# Patient Record
Sex: MALE | Race: WHITE | NOT HISPANIC OR LATINO | Employment: OTHER | URBAN - METROPOLITAN AREA
[De-identification: names, ages, dates, MRNs, and addresses within clinical notes are randomized per-mention and may not be internally consistent; named-entity substitution may affect disease eponyms.]

---

## 2018-12-22 ENCOUNTER — HOSPITAL ENCOUNTER (EMERGENCY)
Facility: HOSPITAL | Age: 63
Discharge: HOME | End: 2018-12-22
Attending: EMERGENCY MEDICINE | Admitting: EMERGENCY MEDICINE

## 2018-12-22 ENCOUNTER — APPOINTMENT (EMERGENCY)
Dept: RADIOLOGY | Facility: HOSPITAL | Age: 63
End: 2018-12-22

## 2018-12-22 VITALS
SYSTOLIC BLOOD PRESSURE: 138 MMHG | DIASTOLIC BLOOD PRESSURE: 88 MMHG | OXYGEN SATURATION: 96 % | HEART RATE: 93 BPM | BODY MASS INDEX: 26.05 KG/M2 | TEMPERATURE: 99.3 F | RESPIRATION RATE: 16 BRPM | WEIGHT: 182 LBS | HEIGHT: 70 IN

## 2018-12-22 DIAGNOSIS — S16.1XXA STRAIN OF NECK MUSCLE, INITIAL ENCOUNTER: Primary | ICD-10-CM

## 2018-12-22 PROCEDURE — 99283 EMERGENCY DEPT VISIT LOW MDM: CPT

## 2018-12-22 PROCEDURE — 72050 X-RAY EXAM NECK SPINE 4/5VWS: CPT

## 2018-12-22 RX ORDER — IBUPROFEN 600 MG/1
600 TABLET ORAL EVERY 6 HOURS PRN
Qty: 30 TABLET | Refills: 0 | Status: SHIPPED | OUTPATIENT
Start: 2018-12-22 | End: 2019-10-30

## 2018-12-22 RX ORDER — METHOCARBAMOL 500 MG/1
500 TABLET, FILM COATED ORAL 2 TIMES DAILY
Qty: 20 TABLET | Refills: 0 | Status: SHIPPED | OUTPATIENT
Start: 2018-12-22 | End: 2019-10-30

## 2018-12-22 RX ORDER — IBUPROFEN 600 MG/1
600 TABLET ORAL ONCE
Status: COMPLETED | OUTPATIENT
Start: 2018-12-22 | End: 2018-12-22

## 2018-12-22 RX ADMIN — IBUPROFEN 600 MG: 600 TABLET ORAL at 11:03

## 2018-12-22 NOTE — ED PROVIDER NOTES
History  Chief Complaint   Patient presents with    Neck Pain     I have pain to my right side neck  I cannot recall and trauma  Patient can turn head somewhat to right, but not to left  History provided by:  Patient   used: No    Medical Problem   Location:  Pt with posterior neck pain   Severity:  Moderate  Onset quality:  Gradual  Duration:  2 days  Timing:  Constant  Chronicity:  New  Context:  Pt denies trauma  Associated symptoms: no abdominal pain, no chest pain, no congestion, no cough, no diarrhea, no ear pain, no fatigue, no fever, no headaches, no loss of consciousness, no myalgias, no nausea, no rash, no rhinorrhea, no shortness of breath, no sore throat, no vomiting and no wheezing        Prior to Admission Medications   Prescriptions Last Dose Informant Patient Reported? Taking?   multivitamin-iron-minerals-folic acid (CENTRUM) chewable tablet   Yes No   Sig: Chew 1 tablet daily      Facility-Administered Medications: None       History reviewed  No pertinent past medical history  History reviewed  No pertinent surgical history  History reviewed  No pertinent family history  I have reviewed and agree with the history as documented  Social History   Substance Use Topics    Smoking status: Current Some Day Smoker     Types: Cigars    Smokeless tobacco: Never Used    Alcohol use No        Review of Systems   Constitutional: Negative  Negative for fatigue and fever  HENT: Negative  Negative for congestion, ear pain, rhinorrhea and sore throat  Eyes: Negative  Respiratory: Negative  Negative for cough, shortness of breath and wheezing  Cardiovascular: Negative  Negative for chest pain  Gastrointestinal: Negative  Negative for abdominal pain, diarrhea, nausea and vomiting  Endocrine: Negative  Genitourinary: Negative  Musculoskeletal: Negative  Negative for myalgias  Skin: Negative  Negative for rash  Allergic/Immunologic: Negative  Neurological: Negative  Negative for loss of consciousness and headaches  Hematological: Negative  Psychiatric/Behavioral: Negative  All other systems reviewed and are negative  Physical Exam  Physical Exam   Constitutional: He is oriented to person, place, and time  He appears well-developed and well-nourished  HENT:   Head: Normocephalic and atraumatic  Right Ear: External ear normal    Left Ear: External ear normal    Nose: Nose normal    Mouth/Throat: Oropharynx is clear and moist    Eyes: Pupils are equal, round, and reactive to light  Conjunctivae and EOM are normal    Neck: Normal range of motion  Neck supple  bilat trapezius tenderness  No erythema no swelling    Cardiovascular: Normal rate, regular rhythm and normal heart sounds  Pulmonary/Chest: Effort normal and breath sounds normal    Abdominal: Soft  Bowel sounds are normal    Musculoskeletal: Normal range of motion  Neurological: He is alert and oriented to person, place, and time  Skin: Skin is warm  Psychiatric: He has a normal mood and affect  His behavior is normal    Nursing note and vitals reviewed  Vital Signs  ED Triage Vitals [12/22/18 1002]   Temperature Pulse Respirations Blood Pressure SpO2   99 3 °F (37 4 °C) 93 16 138/88 96 %      Temp Source Heart Rate Source Patient Position - Orthostatic VS BP Location FiO2 (%)   Tymp Core -- Sitting Left arm --      Pain Score       7           Vitals:    12/22/18 1002   BP: 138/88   Pulse: 93   Patient Position - Orthostatic VS: Sitting       Visual Acuity      ED Medications  Medications   ibuprofen (MOTRIN) tablet 600 mg (600 mg Oral Given 12/22/18 1103)       Diagnostic Studies  Results Reviewed     None                 XR spine cervical complete 4 or 5 vw non injury   Final Result by Lloyd Kowalski DO (12/22 1414)      No acute osseous abnormality  Degenerative changes as above  Limited evaluation of the left-sided neural foramina   If patient has left-sided radicular symptoms, repeat oblique view could be obtained at no additional cost to the patient  Thin lucent halo surrounding the root of the right most posterior mandibular molar  Correlate clinically for loosening and/or infection  Workstation performed: [id number]                    Procedures  Procedures       Phone Contacts  ED Phone Contact    ED Course                               MDM  CritCare Time    Disposition  Final diagnoses:   Strain of neck muscle, initial encounter     Time reflects when diagnosis was documented in both MDM as applicable and the Disposition within this note     Time User Action Codes Description Comment    12/22/2018 10:56 AM Guy Patel  Add [S16  1XXA] Strain of neck muscle, initial encounter       ED Disposition     ED Disposition Condition Comment    Discharge  Deatra Polo discharge to home/self care      Condition at discharge: Good        Follow-up Information     Follow up With Specialties Details Why Contact Info Additional [address] [facility] Schedule an appointment as soon as possible for a visit  [address], [address] [facility] [address]  [address], [address], [address] [place], [place], [address]          Discharge Medication List as of 12/22/2018 10:57 AM      START taking these medications    Details   ibuprofen (MOTRIN) 600 mg tablet Take 1 tablet (600 mg total) by mouth every 6 (six) hours as needed (pain), Starting Sat 12/22/2018, Print      methocarbamol (ROBAXIN) 500 mg tablet Take 1 tablet (500 mg total) by mouth 2 (two) times a day, Starting Sat 12/22/2018, Print         CONTINUE these medications which have NOT CHANGED    Details   multivitamin-iron-minerals-folic acid (CENTRUM) chewable tablet Chew 1 tablet daily, Until Discontinued, Historical Med           No discharge procedures on file     ED Provider  Electronically Signed by           Kari Al PA-C  12/22/18 9979

## 2018-12-22 NOTE — DISCHARGE INSTRUCTIONS
Cervical Strain   WHAT YOU NEED TO KNOW:   A cervical strain is a stretched or torn muscle or tendon in your neck  Tendons are strong tissues that connect muscles to bones  Common causes of cervical strains include a car accident, a fall, or a sports injury  DISCHARGE INSTRUCTIONS:   Seek care immediately if:   · You have pain or numbness from your shoulder down to your hand  · You have problems with your vision, hearing, or balance  · You feel confused or cannot concentrate  · You have problems with movement and strength  Contact your healthcare provider if:   · You have increased swelling or pain in your neck  · You have questions or concerns about your condition or care  Medicines: You may need any of the following:  · Acetaminophen  decreases pain and fever  It is available without a doctor's order  Ask how much to take and how often to take it  Follow directions  Read the labels of all other medicines you are using to see if they also contain acetaminophen, or ask your doctor or pharmacist  Acetaminophen can cause liver damage if not taken correctly  Do not use more than 4 grams (4,000 milligrams) total of acetaminophen in one day  · NSAIDs , such as ibuprofen, help decrease swelling, pain, and fever  This medicine is available with or without a doctor's order  NSAIDs can cause stomach bleeding or kidney problems in certain people  If you take blood thinner medicine, always ask your healthcare provider if NSAIDs are safe for you  Always read the medicine label and follow directions  · Muscle relaxers  help decrease pain and muscle spasms  · Prescription pain medicine  may be given  Ask your healthcare provider how to take this medicine safely  Some prescription pain medicines contain acetaminophen  Do not take other medicines that contain acetaminophen without talking to your healthcare provider  Too much acetaminophen may cause liver damage   Prescription pain medicine may cause constipation  Ask your healthcare provider how to prevent or treat constipation  · Take your medicine as directed  Contact your healthcare provider if you think your medicine is not helping or if you have side effects  Tell him or her if you are allergic to any medicine  Keep a list of the medicines, vitamins, and herbs you take  Include the amounts, and when and why you take them  Bring the list or the pill bottles to follow-up visits  Carry your medicine list with you in case of an emergency  Manage your symptoms:   · Apply heat  on your neck for 15 to 20 minutes, 4 to 6 times a day or as directed  Heat helps decrease pain, stiffness, and muscle spasms  · Begin gentle neck exercises  as soon as you can move your neck without pain  Exercises will help decrease stiffness and improve the strength and movement of your neck  Ask your healthcare provider what kind of exercises you should do  · Gradually return to your usual activities as directed  Stop if you have pain  Avoid activities that can cause more damage to your neck, such as heavy lifting or strenuous exercise  · Sleep without a pillow  to help decrease pain  Instead, roll a small towel tightly and place it under your neck  · Go to physical therapy as directed  A physical therapist teaches you exercises to help improve movement and strength, and to decrease pain  Prevent neck injury:   · Drive safely  Make sure everyone in your car wears a seatbelt  A seatbelt can save your life if you are in an accident  Do not use your cell phone when you are driving  This could distract you and cause an accident  Pull over if you need to make a call or send a text message  · Wear helmets, lifejackets, and protective gear  Always wear a helmet when you ride a bike or motorcycle, go skiing, or play sports that could cause a head injury  Wear protective equipment when you play sports   Wear a lifejacket when you are on a boat or doing water sports  Follow up with your healthcare provider as directed: You may be referred to an orthopedist or physical therapies  Write down your questions so you remember to ask them during your visits  © 2017 [address] Information is for End User's use only and may not be sold, redistributed or otherwise used for commercial purposes  All illustrations and images included in CareNotes® are the copyrighted property of A D A M [employer] Inc  or [employer]u  The above information is an  only  It is not intended as medical advice for individual conditions or treatments  Talk to your doctor, nurse or pharmacist before following any medical regimen to see if it is safe and effective for you

## 2019-03-17 ENCOUNTER — HOSPITAL ENCOUNTER (EMERGENCY)
Facility: HOSPITAL | Age: 64
Discharge: HOME | End: 2019-03-17
Attending: EMERGENCY MEDICINE | Admitting: EMERGENCY MEDICINE

## 2019-03-17 ENCOUNTER — APPOINTMENT (EMERGENCY)
Dept: RADIOLOGY | Facility: HOSPITAL | Age: 64
End: 2019-03-17

## 2019-03-17 VITALS
SYSTOLIC BLOOD PRESSURE: 177 MMHG | HEIGHT: 68 IN | DIASTOLIC BLOOD PRESSURE: 90 MMHG | BODY MASS INDEX: 28.19 KG/M2 | HEART RATE: 89 BPM | TEMPERATURE: 98 F | RESPIRATION RATE: 16 BRPM | WEIGHT: 186 LBS | OXYGEN SATURATION: 98 %

## 2019-03-17 DIAGNOSIS — S93.609A FOOT SPRAIN: Primary | ICD-10-CM

## 2019-03-17 DIAGNOSIS — S83.90XA: ICD-10-CM

## 2019-03-17 PROCEDURE — 73630 X-RAY EXAM OF FOOT: CPT

## 2019-03-17 PROCEDURE — 73590 X-RAY EXAM OF LOWER LEG: CPT

## 2019-03-17 PROCEDURE — 99283 EMERGENCY DEPT VISIT LOW MDM: CPT

## 2019-03-17 RX ORDER — IBUPROFEN 600 MG/1
600 TABLET ORAL EVERY 6 HOURS PRN
Qty: 30 TABLET | Refills: 0 | Status: SHIPPED | OUTPATIENT
Start: 2019-03-17 | End: 2019-10-30

## 2019-03-17 NOTE — ED PROVIDER NOTES
History  Chief Complaint   Patient presents with    Foot Swelling     I have some swelling and pain to my left foot and lower leg  I have had it for two weeks  Pain mostly at night  History provided by:  Patient   used: No    Medical Problem   Location:  Pt with left foot and lower leg pain  no trauma for 1 week  Severity:  Mild  Onset quality:  Gradual  Duration:  1 week  Timing:  Constant  Chronicity:  New  Associated symptoms: no abdominal pain, no chest pain, no congestion, no cough, no diarrhea, no ear pain, no fatigue, no fever, no headaches, no loss of consciousness, no myalgias, no nausea, no rash, no rhinorrhea, no shortness of breath, no sore throat, no vomiting and no wheezing        Prior to Admission Medications   Prescriptions Last Dose Informant Patient Reported? Taking?   ibuprofen (MOTRIN) 600 mg tablet   No No   Sig: Take 1 tablet (600 mg total) by mouth every 6 (six) hours as needed (pain)   methocarbamol (ROBAXIN) 500 mg tablet   No No   Sig: Take 1 tablet (500 mg total) by mouth 2 (two) times a day   multivitamin-iron-minerals-folic acid (CENTRUM) chewable tablet   Yes No   Sig: Chew 1 tablet daily      Facility-Administered Medications: None       History reviewed  No pertinent past medical history  History reviewed  No pertinent surgical history  History reviewed  No pertinent family history  I have reviewed and agree with the history as documented  Social History     Tobacco Use    Smoking status: Current Some Day Smoker     Types: Cigars    Smokeless tobacco: Never Used   Substance Use Topics    Alcohol use: No    Drug use: No        Review of Systems   Constitutional: Negative  Negative for fatigue and fever  HENT: Negative  Negative for congestion, ear pain, rhinorrhea and sore throat  Eyes: Negative  Respiratory: Negative  Negative for cough, shortness of breath and wheezing  Cardiovascular: Negative  Negative for chest pain  Gastrointestinal: Negative  Negative for abdominal pain, diarrhea, nausea and vomiting  Endocrine: Negative  Genitourinary: Negative  Musculoskeletal: Negative  Negative for myalgias  Skin: Negative  Negative for rash  Allergic/Immunologic: Negative  Neurological: Negative  Negative for loss of consciousness and headaches  Hematological: Negative  Psychiatric/Behavioral: Negative  All other systems reviewed and are negative  Physical Exam  Physical Exam   Constitutional: He is oriented to person, place, and time  He appears well-developed and well-nourished  Pt declines splint     HENT:   Head: Normocephalic  Right Ear: External ear normal    Left Ear: External ear normal    Mouth/Throat: Oropharynx is clear and moist    Eyes: Pupils are equal, round, and reactive to light  Conjunctivae and EOM are normal    Neck: Normal range of motion  Neck supple  Cardiovascular: Normal rate, regular rhythm and normal heart sounds  Pulmonary/Chest: Effort normal and breath sounds normal    Abdominal: Soft  Bowel sounds are normal    Musculoskeletal:   Left foot dorsum tenderness  No swelling  Left midshaft tibia and fibula pain    Neurological: He is alert and oriented to person, place, and time  Skin: Skin is warm  Psychiatric: He has a normal mood and affect  His behavior is normal    Nursing note and vitals reviewed        Vital Signs  ED Triage Vitals [03/17/19 0911]   Temperature Pulse Respirations Blood Pressure SpO2   98 °F (36 7 °C) 89 16 (!) 177/90 98 %      Temp Source Heart Rate Source Patient Position - Orthostatic VS BP Location FiO2 (%)   Tympanic Monitor Sitting Left arm --      Pain Score       6           Vitals:    03/17/19 0911   BP: (!) 177/90   Pulse: 89   Patient Position - Orthostatic VS: Sitting       qSOFA     Row Name 03/17/19 0911                Altered mental status GCS < 15  --        Respiratory Rate > / =15  0        Systolic BP < / =948  0        Q Sofa Score  0              Visual Acuity      ED Medications  Medications - No data to display    Diagnostic Studies  Results Reviewed     None                 XR foot 3+ views LEFT   Final Result by Sam Francois MD (03/17 1104)      Small calcaneal plantar spur  No acute osseous abnormality            Workstation performed: [id number]         XR tibia fibula 2 views LEFT   Final Result by Kelton Monterroso MD (03/17 1104)      No acute osseous abnormality  Workstation performed: [id number]                    Procedures  Procedures       Phone Contacts  ED Phone Contact    ED Course                               MDM    Disposition  Final diagnoses: Foot sprain   Sprain of lower leg     Time reflects when diagnosis was documented in both MDM as applicable and the Disposition within this note     Time User Action Codes Description Comment    3/17/2019  9:49 AM Regina Schwab  Add [S93 609A] Foot sprain     3/17/2019  9:49 AM Keya Emmanuel  Add [S83 90XA] Sprain of lower leg       ED Disposition     ED Disposition Condition Date/Time Comment    Discharge Stable Sun Mar 17, 2019  9:51 AM Esequiel Fonseca discharge to home/self care  Follow-up Information     Follow up With Specialties Details Why [address] [facility]   [address]  [place] [address] [facility]  [phone]            Discharge Medication List as of 3/17/2019  9:51 AM      START taking these medications    Details   !! ibuprofen (MOTRIN) 600 mg tablet Take 1 tablet (600 mg total) by mouth every 6 (six) hours as needed (pain), Starting Sun 3/17/2019, Print       !! - Potential duplicate medications found  Please discuss with provider        CONTINUE these medications which have NOT CHANGED    Details   !! ibuprofen (MOTRIN) 600 mg tablet Take 1 tablet (600 mg total) by mouth every 6 (six) hours as needed (pain), Starting Sat 12/22/2018, Print      methocarbamol (ROBAXIN) 500 mg tablet Take 1 tablet (500 mg total) by mouth 2 (two) times a day, Starting Sat 12/22/2018, Print      multivitamin-iron-minerals-folic acid (CENTRUM) chewable tablet Chew 1 tablet daily, Until Discontinued, Historical Med       !! - Potential duplicate medications found  Please discuss with provider  No discharge procedures on file      ED Provider  Electronically Signed by           Marian Pickard PA-C  03/17/19 5435

## 2019-10-30 ENCOUNTER — HOSPITAL ENCOUNTER (EMERGENCY)
Facility: HOSPITAL | Age: 64
Discharge: HOME | End: 2019-10-30
Attending: EMERGENCY MEDICINE | Admitting: EMERGENCY MEDICINE

## 2019-10-30 VITALS
OXYGEN SATURATION: 97 % | HEART RATE: 76 BPM | BODY MASS INDEX: 27.69 KG/M2 | SYSTOLIC BLOOD PRESSURE: 153 MMHG | RESPIRATION RATE: 16 BRPM | DIASTOLIC BLOOD PRESSURE: 71 MMHG | TEMPERATURE: 98 F | WEIGHT: 182.1 LBS

## 2019-10-30 DIAGNOSIS — H57.89 IRRITATION OF RIGHT EYE: Primary | ICD-10-CM

## 2019-10-30 PROCEDURE — 99283 EMERGENCY DEPT VISIT LOW MDM: CPT

## 2019-10-30 PROCEDURE — 99283 EMERGENCY DEPT VISIT LOW MDM: CPT | Performed by: PHYSICIAN ASSISTANT

## 2019-10-30 RX ORDER — TETRACAINE HYDROCHLORIDE 5 MG/ML
1 SOLUTION OPHTHALMIC ONCE
Status: COMPLETED | OUTPATIENT
Start: 2019-10-30 | End: 2019-10-30

## 2019-10-30 RX ADMIN — TETRACAINE HYDROCHLORIDE 1 DROP: 5 SOLUTION OPHTHALMIC at 07:35

## 2019-10-30 RX ADMIN — FLUORESCEIN SODIUM 1 STRIP: 1 STRIP OPHTHALMIC at 07:34

## 2019-10-30 NOTE — ED PROVIDER NOTES
History  Chief Complaint   Patient presents with    Foreign Body in [address] in right eye  Attempted to wash eye out and using visine without relief  Redness noted  Patient is a 36-year-old male who states that he was cutting plywood yesterday without safety glasses on and bleeding got something into his eye  He complains of pain to the right eye  He denies any visual changes  No known allergies          Prior to Admission Medications   Prescriptions Last Dose Informant Patient Reported? Taking?   multivitamin-iron-minerals-folic acid (CENTRUM) chewable tablet   Yes Yes   Sig: Chew 1 tablet daily      Facility-Administered Medications: None       History reviewed  No pertinent past medical history  History reviewed  No pertinent surgical history  History reviewed  No pertinent family history  I have reviewed and agree with the history as documented  Social History     Tobacco Use    Smoking status: Current Some Day Smoker     Types: Cigars    Smokeless tobacco: Never Used   Substance Use Topics    Alcohol use: No    Drug use: No        Review of Systems   Constitutional: Negative for chills, fatigue and fever  HENT: Negative for congestion, ear pain, rhinorrhea, sinus pressure, sneezing and sore throat  Eyes: Positive for pain, redness and itching  Respiratory: Negative for cough, choking, chest tightness, shortness of breath and wheezing  Cardiovascular: Negative for chest pain and palpitations  Gastrointestinal: Negative for abdominal pain, constipation, diarrhea, nausea and vomiting  All other systems reviewed and are negative  Physical Exam  Physical Exam   Constitutional: He is oriented to person, place, and time  Vital signs are normal  He appears well-developed and well-nourished  He is cooperative  No distress  HENT:   Head: Normocephalic and atraumatic     Right Ear: External ear normal    Left Ear: External ear normal    Nose: Nose normal    Mouth/Throat: Oropharynx is clear and moist    Eyes: Pupils are equal, round, and reactive to light  EOM and lids are normal  Lids are everted and swept, no foreign bodies found  Right conjunctiva is injected  Right eye redness  Patient complains of foreign body sensation  Fluorescein staining did not reveal any corneal abrasions  No foreign bodies identified  Neck: Normal range of motion  Neck supple  Cardiovascular: Normal rate, regular rhythm, normal heart sounds and intact distal pulses  Exam reveals no gallop and no friction rub  No murmur heard  Pulmonary/Chest: Effort normal and breath sounds normal  No stridor  No respiratory distress  He has no wheezes  He has no rales  Abdominal: Soft  Bowel sounds are normal  He exhibits no distension  There is no tenderness  There is no guarding  Musculoskeletal: Normal range of motion  Neurological: He is alert and oriented to person, place, and time  Skin: Skin is warm  Capillary refill takes less than 2 seconds  Nursing note and vitals reviewed  Vital Signs  ED Triage Vitals [10/30/19 0714]   Temperature Pulse Respirations Blood Pressure SpO2   98 °F (36 7 °C) 76 16 153/71 97 %      Temp Source Heart Rate Source Patient Position - Orthostatic VS BP Location FiO2 (%)   Oral Monitor Sitting Right arm --      Pain Score       8           Vitals:    10/30/19 0714   BP: 153/71   Pulse: 76   Patient Position - Orthostatic VS: Sitting         Visual Acuity  Visual Acuity      Most Recent Value   Visual acuity R eye is  20/100   Visual acuity Left eye is  20/50   Visual acuity in both eyes is  20/200   Wearing corrective eyewear/lenses?   No          ED Medications  Medications   tetracaine 0 5 % ophthalmic solution 1 drop (1 drop Both Eyes Given 10/30/19 0735)   fluorescein sodium sterile ophthalmic strip 1 strip (1 strip Left Eye Given 10/30/19 0734)       Diagnostic Studies  Results Reviewed     None                 No orders to display Procedures  Procedures       ED Course                               MDM  Number of Diagnoses or Management Options  Diagnosis management comments: Recommend follow-up with Ophthalmology  No foreign bodies or abrasions identified by me at this time  Fluorescein staining was unremarkable  Patient understanding and in agreement with need to see Ophthalmology  Tetracaine drops were provided symptomatic relief  Risk of Complications, Morbidity, and/or Mortality  Presenting problems: moderate  Diagnostic procedures: low  Management options: low    Patient Progress  Patient progress: stable      Disposition  Final diagnoses:   Irritation of right eye     Time reflects when diagnosis was documented in both MDM as applicable and the Disposition within this note     Time User Action Codes Description Comment    10/30/2019  7:45 AM Maurizio Purvis Add [H57 89] Irritation of right eye       ED Disposition     ED Disposition Condition Date/Time Comment    Discharge Stable Wed Oct 30, 2019  7:45 AM Sonya Munoz discharge to home/self care  Follow-up Information     Follow up With Specialties Details Why [facility] Schedule an appointment as soon as possible for a visit   [facility]  [phone]            Patient's Medications   Discharge Prescriptions    No medications on file     No discharge procedures on file      ED Provider  Electronically Signed by           Kaley Almonte PA-C  10/30/19 0399

## 2020-12-29 ENCOUNTER — HOSPITAL ENCOUNTER (EMERGENCY)
Facility: HOSPITAL | Age: 65
Discharge: HOME | End: 2020-12-29
Attending: EMERGENCY MEDICINE | Admitting: EMERGENCY MEDICINE

## 2020-12-29 ENCOUNTER — APPOINTMENT (EMERGENCY)
Dept: RADIOLOGY | Facility: HOSPITAL | Age: 65
End: 2020-12-29

## 2020-12-29 VITALS
OXYGEN SATURATION: 100 % | RESPIRATION RATE: 16 BRPM | BODY MASS INDEX: 28.59 KG/M2 | HEART RATE: 95 BPM | TEMPERATURE: 98.4 F | SYSTOLIC BLOOD PRESSURE: 148 MMHG | DIASTOLIC BLOOD PRESSURE: 76 MMHG | WEIGHT: 188.05 LBS

## 2020-12-29 DIAGNOSIS — M54.16 LUMBAR RADICULOPATHY: Primary | ICD-10-CM

## 2020-12-29 PROCEDURE — 99284 EMERGENCY DEPT VISIT MOD MDM: CPT

## 2020-12-29 PROCEDURE — 72100 X-RAY EXAM L-S SPINE 2/3 VWS: CPT

## 2020-12-29 PROCEDURE — 96372 THER/PROPH/DIAG INJ SC/IM: CPT

## 2020-12-29 PROCEDURE — 99284 EMERGENCY DEPT VISIT MOD MDM: CPT | Performed by: PHYSICIAN ASSISTANT

## 2020-12-29 PROCEDURE — 73502 X-RAY EXAM HIP UNI 2-3 VIEWS: CPT

## 2020-12-29 RX ORDER — CYCLOBENZAPRINE HCL 10 MG
10 TABLET ORAL ONCE
Status: COMPLETED | OUTPATIENT
Start: 2020-12-29 | End: 2020-12-29

## 2020-12-29 RX ORDER — KETOROLAC TROMETHAMINE 30 MG/ML
15 INJECTION, SOLUTION INTRAMUSCULAR; INTRAVENOUS ONCE
Status: COMPLETED | OUTPATIENT
Start: 2020-12-29 | End: 2020-12-29

## 2020-12-29 RX ORDER — CYCLOBENZAPRINE HCL 10 MG
10 TABLET ORAL 2 TIMES DAILY PRN
Qty: 20 TABLET | Refills: 0 | Status: SHIPPED | OUTPATIENT
Start: 2020-12-29 | End: 2022-01-25 | Stop reason: SDUPTHER

## 2020-12-29 RX ORDER — KETOROLAC TROMETHAMINE 10 MG/1
10 TABLET, FILM COATED ORAL EVERY 6 HOURS PRN
Qty: 20 TABLET | Refills: 0 | Status: SHIPPED | OUTPATIENT
Start: 2020-12-29 | End: 2022-01-25

## 2020-12-29 RX ADMIN — KETOROLAC TROMETHAMINE 15 MG: 30 INJECTION, SOLUTION INTRAMUSCULAR; INTRAVENOUS at 01:04

## 2020-12-29 RX ADMIN — CYCLOBENZAPRINE HYDROCHLORIDE 10 MG: 10 TABLET, FILM COATED ORAL at 01:04

## 2020-12-29 NOTE — ED PROVIDER NOTES
History  Chief Complaint   Patient presents with    Hip Pain     Patient fell Micha hernandez, did not feel any pain or injury and tonight he started to feel left hip and upper leg pain, left side, did not take any medicine for pain  Patient presents for an evaluation of left sided lower back, left buttock, and left lateral thigh pain ongoing for the last 1-2 hours  He reports that he did fall 5 days ago, but he fell on his right leg and had no pain at the time  States that today he had been mostly watching TV when pain suddenly started  Denies history of lumbar radiculopathy  Denies any IV drug use, fever, chills, difficulty ambulating, bowel/ bladder incontinence, history of chronic steroid use, saddle anesthesia  Has not tried anything for symptoms prior to arrival  No other complaints  Prior to Admission Medications   Prescriptions Last Dose Informant Patient Reported? Taking?   multivitamin-iron-minerals-folic acid (CENTRUM) chewable tablet   Yes No   Sig: Chew 1 tablet daily      Facility-Administered Medications: None       Past Medical History:   Diagnosis Date    Pericarditis        History reviewed  No pertinent surgical history  History reviewed  No pertinent family history  I have reviewed and agree with the history as documented  E-Cigarette/Vaping    E-Cigarette Use Never User      E-Cigarette/Vaping Substances     Social History     Tobacco Use    Smoking status: Current Some Day Smoker     Types: Cigars    Smokeless tobacco: Never Used   Substance Use Topics    Alcohol use: Yes     Comment: socially    Drug use: No       Review of Systems   Constitutional: Negative for chills and fever  HENT: Negative for congestion, ear pain and sore throat  Eyes: Negative for pain  Respiratory: Negative for cough and shortness of breath  Cardiovascular: Negative for chest pain  Gastrointestinal: Negative for abdominal pain, nausea and vomiting     Genitourinary: Negative for dysuria  Musculoskeletal: Positive for arthralgias, back pain and myalgias  Skin: Negative for rash  Neurological: Negative for dizziness, weakness and numbness  Psychiatric/Behavioral: Negative for suicidal ideas  All other systems reviewed and are negative  Physical Exam  Physical Exam  Vitals signs reviewed  Constitutional:       General: He is not in acute distress  Appearance: He is well-developed  He is not diaphoretic  HENT:      Head: Normocephalic and atraumatic  Right Ear: External ear normal       Left Ear: External ear normal       Nose: Nose normal    Eyes:      Pupils: Pupils are equal, round, and reactive to light  Neck:      Musculoskeletal: Normal range of motion and neck supple  Cardiovascular:      Rate and Rhythm: Normal rate and regular rhythm  Heart sounds: Normal heart sounds  Pulmonary:      Effort: Pulmonary effort is normal       Breath sounds: Normal breath sounds  Abdominal:      General: Bowel sounds are normal       Palpations: Abdomen is soft  Tenderness: There is no abdominal tenderness  Musculoskeletal:      Left hip: He exhibits decreased range of motion and tenderness  He exhibits normal strength, no bony tenderness, no swelling, no crepitus, no deformity and no laceration  Back:         Legs:       Comments: Pain mostly at lateral aspect of left thigh  No midline lumbar tenderness  Neurovascularly intact distally with full sensation  2+ pedal pulses b/l   Skin:     General: Skin is warm and dry  Neurological:      Mental Status: He is alert and oriented to person, place, and time           Vital Signs  ED Triage Vitals [12/29/20 0102]   Temperature Pulse Respirations Blood Pressure SpO2   98 4 °F (36 9 °C) 95 16 148/76 100 %      Temp Source Heart Rate Source Patient Position - Orthostatic VS BP Location FiO2 (%)   Tympanic Monitor Lying Left arm --      Pain Score       Worst Possible Pain           Vitals:    12/29/20 0102 BP: 148/76   Pulse: 95   Patient Position - Orthostatic VS: Lying         Visual Acuity      ED Medications  Medications   cyclobenzaprine (FLEXERIL) tablet 10 mg (10 mg Oral Given 12/29/20 0104)   ketorolac (TORADOL) injection 15 mg (15 mg Intramuscular Given 12/29/20 0104)       Diagnostic Studies  Results Reviewed     None                 XR spine lumbar 2 or 3 views injury    (Results Pending)   XR hip/pelv 2-3 vws left if performed    (Results Pending)              Procedures  Procedures         ED Course                             SBIRT 20yo+      Most Recent Value   SBIRT (22 yo +)   In order to provide better care to our patients, we are screening all of our patients for alcohol and drug use  Would it be okay to ask you these screening questions? Yes Filed at: 12/29/2020 0122   Initial Alcohol Screen: US AUDIT-C    1  How often do you have a drink containing alcohol? 2 Filed at: 12/29/2020 0122   2  How many drinks containing alcohol do you have on a typical day you are drinking? 1 Filed at: 12/29/2020 0122   3a  Male UNDER 65: How often do you have five or more drinks on one occasion? 2 Filed at: 12/29/2020 0122   Audit-C Score  5 Filed at: 12/29/2020 0122   VANESSA: How many times in the past year have you    Used an illegal drug or used a prescription medication for non-medical reasons? Never Filed at: 12/29/2020 0122                    MDM  Number of Diagnoses or Management Options  Lumbar radiculopathy:   Diagnosis management comments: No fx/ dislocation on xrs  Severe degenerative disc disease noted  Will dc with toradol/ flexeril  Amb referral placed [facility]  Given referral as well  Agreeable with discharge  Patient verbalizes understanding and agrees with plan  The management plan was discussed in detail with the patient at bedside and all questions were answered  Prior to discharge, I provided both verbal and written instructions   I discussed with the patient the signs and symptoms for which to return to the emergency department  All questions were answered and patient was comfortable with the plan of care and discharged to home  The patient agrees to return to the Emergency Department for concerns and/or progression of illness  Disposition  Final diagnoses:   Lumbar radiculopathy     Time reflects when diagnosis was documented in both MDM as applicable and the Disposition within this note     Time User Action Codes Description Comment    12/29/2020  1:50 AM Ludwin Hung Add [T24 16] Lumbar radiculopathy       ED Disposition     ED Disposition Condition Date/Time Comment    Discharge Stable Tue Dec 29, 2020  1:50 AM Salvador Ohms discharge to home/self care              Follow-up Information     Follow up With Specialties Details Why Contact Info Additional Information    [facility] - [facility] Therapy   [phone]          Patient's Medications   Discharge Prescriptions    CYCLOBENZAPRINE (FLEXERIL) 10 MG TABLET    Take 1 tablet (10 mg total) by mouth 2 (two) times a day as needed for muscle spasms       Start Date: 12/29/2020End Date: --       Order Dose: 10 mg       Quantity: 20 tablet    Refills: 0    KETOROLAC (TORADOL) 10 MG TABLET    Take 1 tablet (10 mg total) by mouth every 6 (six) hours as needed for moderate pain       Start Date: 12/29/2020End Date: --       Order Dose: 10 mg       Quantity: 20 tablet    Refills: 0         PDMP Review     None          ED Provider  Electronically Signed by           Libra Sierra PA-C  12/29/20 0202

## 2020-12-29 NOTE — DISCHARGE INSTRUCTIONS
Please [facility] with the [facility]  I have provided you with a referral  Their office should also give you a call concerning a follow up appointment  Use medication as prescribed  Please return to the ER with any worsening symptoms

## 2021-01-12 ENCOUNTER — TELEPHONE (OUTPATIENT)
Dept: PHYSICAL THERAPY | Facility: OTHER | Age: 66
End: 2021-01-12

## 2021-01-12 NOTE — TELEPHONE ENCOUNTER
Voice mail/message left requesting patient to return call to [facility] program including our hours of business and phone number  Kindly asked to LM with Full Name,  and Reminded CB will come from a non- number as the nurses are working remotely/off-site      Referral deferred for f/u attempt per protocol

## 2021-03-10 DIAGNOSIS — Z23 ENCOUNTER FOR IMMUNIZATION: ICD-10-CM

## 2022-01-11 ENCOUNTER — TELEPHONE (OUTPATIENT)
Dept: FAMILY MEDICINE CLINIC | Facility: CLINIC | Age: 67
End: 2022-01-11

## 2022-01-25 ENCOUNTER — OFFICE VISIT (OUTPATIENT)
Dept: FAMILY MEDICINE CLINIC | Facility: CLINIC | Age: 67
End: 2022-01-25

## 2022-01-25 VITALS
SYSTOLIC BLOOD PRESSURE: 122 MMHG | TEMPERATURE: 97.9 F | WEIGHT: 192 LBS | RESPIRATION RATE: 16 BRPM | HEART RATE: 93 BPM | DIASTOLIC BLOOD PRESSURE: 86 MMHG | BODY MASS INDEX: 27.49 KG/M2 | OXYGEN SATURATION: 94 % | HEIGHT: 70 IN

## 2022-01-25 DIAGNOSIS — M79.605 BILATERAL LEG PAIN: ICD-10-CM

## 2022-01-25 DIAGNOSIS — Z76.89 ENCOUNTER TO ESTABLISH CARE: Primary | ICD-10-CM

## 2022-01-25 DIAGNOSIS — Z12.12 SCREENING FOR COLORECTAL CANCER: ICD-10-CM

## 2022-01-25 DIAGNOSIS — M25.511 ACUTE PAIN OF BOTH SHOULDERS: ICD-10-CM

## 2022-01-25 DIAGNOSIS — Z23 ENCOUNTER FOR IMMUNIZATION: ICD-10-CM

## 2022-01-25 DIAGNOSIS — M25.512 ACUTE PAIN OF BOTH SHOULDERS: ICD-10-CM

## 2022-01-25 DIAGNOSIS — M54.16 LUMBAR RADICULOPATHY: ICD-10-CM

## 2022-01-25 DIAGNOSIS — M79.604 BILATERAL LEG PAIN: ICD-10-CM

## 2022-01-25 DIAGNOSIS — Z13.220 LIPID SCREENING: ICD-10-CM

## 2022-01-25 DIAGNOSIS — Z12.11 SCREENING FOR COLORECTAL CANCER: ICD-10-CM

## 2022-01-25 PROCEDURE — G0009 ADMIN PNEUMOCOCCAL VACCINE: HCPCS | Performed by: NURSE PRACTITIONER

## 2022-01-25 PROCEDURE — 90732 PPSV23 VACC 2 YRS+ SUBQ/IM: CPT | Performed by: NURSE PRACTITIONER

## 2022-01-25 PROCEDURE — 99205 OFFICE O/P NEW HI 60 MIN: CPT | Performed by: NURSE PRACTITIONER

## 2022-01-25 RX ORDER — CYCLOBENZAPRINE HCL 10 MG
10 TABLET ORAL 2 TIMES DAILY PRN
Qty: 60 TABLET | Refills: 3 | Status: SHIPPED | OUTPATIENT
Start: 2022-01-25

## 2022-01-25 NOTE — PROGRESS NOTES
Assessment/Plan:    Problem List Items Addressed This Visit        Nervous and Auditory    Lumbar radiculopathy     Start muscle relaxer, f/u with PT  Relevant Medications    cyclobenzaprine (FLEXERIL) 10 mg tablet       Other    Bilateral leg pain     Check electrolytes - if normal will obtain duplex scan  Advised to stay active  F/u with PT  Relevant Orders    CBC and differential    Comprehensive metabolic panel      Other Visit Diagnoses     Encounter to establish care    -  Primary    Encounter for immunization        Relevant Orders    PNEUMOCOCCAL POLYSACCHARIDE VACCINE 23-VALENT =>3YO SQ IM (Completed)    Screening for colorectal cancer        Relevant Orders    Ambulatory referral to Gastroenterology    Acute pain of both shoulders        Relevant Orders    Ambulatory Referral to Physical Therapy    Lipid screening        Relevant Orders    Lipid panel            No follow-ups on file  A chart review was performed and previous primary care visit notes were reviewed  All applicable imaging studies were reviewed and images were reviewed personally  All applicable laboratory studies were reviewed personally  Care everywhere review was performed if  available and all pertinent notes were reviewed  Subjective:     HPI: Patrick Resendez is a 73 y o  male who  has a past medical history of Acute pericarditis and Pericarditis  who presented to the office today   To establish care  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 he was using a table saw, slipped and cut his entire arm completely off distal to the left elbow  It was reattached by trauma surgeon at [facility] however in November a therapeutic amputation had to be done  Laabhijeet Cassette He also has 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 woodworking accidents as well  Girlfriend states he never followed regularly with a doctor, does believe he absolutely needs glasses but has not gone to an eye doctor for evaluation    His only health concern today is bilateral muscular leg pain that occurs only with activity, as well as intermittent neck and shoulder pain  The following portions of the patient's history were reviewed and updated as appropriate: allergies, current medications, past family history, past medical history, past social history, past surgical history, and problem list     Current Outpatient Medications on File Prior to Visit   Medication Sig Dispense Refill    multivitamin-iron-minerals-folic acid (CENTRUM) chewable tablet Chew 1 tablet daily       No current facility-administered medications on file prior to visit  Review of Systems   Constitutional: Negative  HENT: Negative  Eyes: Positive for visual disturbance  Respiratory: Negative  Cardiovascular: Negative  Gastrointestinal: Negative  Genitourinary: Negative  Musculoskeletal: Positive for arthralgias and neck pain  Left arm amputation 2 months ago, b/l leg pain, shoulder pain  Neurological: Negative  Psychiatric/Behavioral: Negative  BMI Counseling: Body mass index is 27 55 kg/m²  The BMI is above normal  Nutrition recommendations include decreasing portion sizes, encouraging healthy choices of fruits and vegetables, decreasing fast food intake, consuming healthier snacks, limiting drinks that contain sugar, moderation in carbohydrate intake, increasing intake of lean protein, reducing intake of saturated and trans fat and reducing intake of cholesterol  Exercise recommendations include moderate physical activity 150 minutes/week, exercising 3-5 times per week and obtaining a gym membership  No pharmacotherapy was ordered  Rationale for BMI follow-up plan is due to patient being overweight or obese  Depression Screening and Follow-up Plan: Patient was screened for depression during today's encounter  They screened negative with a PHQ-2 score of 0  Tobacco Cessation Counseling: Tobacco cessation counseling was provided   The patient is sincerely urged to quit consumption of tobacco  He is not ready to quit tobacco  Medication options discussed  Patient refused medication  Objective:    /86 (BP Location: Right arm, Patient Position: Sitting, Cuff Size: Standard)   Pulse 93   Temp 97 9 °F (36 6 °C)   Resp 16   Ht 5' 10" (1 778 m)   Wt 87 1 kg (192 lb)   SpO2 94%   BMI 27 55 kg/m²     Physical Exam  Constitutional:       Appearance: Normal appearance  He is normal weight  HENT:      Head: Normocephalic  Right Ear: External ear normal       Left Ear: External ear normal       Nose: Nose normal       Mouth/Throat:      Mouth: Mucous membranes are moist    Eyes:      Extraocular Movements: Extraocular movements intact  Conjunctiva/sclera: Conjunctivae normal    Cardiovascular:      Rate and Rhythm: Normal rate and regular rhythm  Pulses: Normal pulses  Heart sounds: Normal heart sounds  Pulmonary:      Effort: Pulmonary effort is normal       Breath sounds: Normal breath sounds  Musculoskeletal:         General: No tenderness or signs of injury  Normal range of motion  Left forearm: Deformity present  No swelling or edema  Right hand: Deformity present  Cervical back: Normal range of motion  Right lower leg: No edema  Left lower leg: No edema  Comments: 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

## 2022-01-27 PROBLEM — M79.604 BILATERAL LEG PAIN: Status: ACTIVE | Noted: 2022-01-27

## 2022-01-27 PROBLEM — M54.16 LUMBAR RADICULOPATHY: Status: ACTIVE | Noted: 2022-01-27

## 2022-01-27 PROBLEM — M79.605 BILATERAL LEG PAIN: Status: ACTIVE | Noted: 2022-01-27

## 2022-01-28 ENCOUNTER — APPOINTMENT (OUTPATIENT)
Dept: LAB | Facility: CLINIC | Age: 67
End: 2022-01-28
Payer: MEDICARE

## 2022-01-28 DIAGNOSIS — M79.605 BILATERAL LEG PAIN: ICD-10-CM

## 2022-01-28 DIAGNOSIS — M79.604 BILATERAL LEG PAIN: ICD-10-CM

## 2022-01-28 DIAGNOSIS — Z13.220 LIPID SCREENING: ICD-10-CM

## 2022-01-28 LAB
ALBUMIN SERPL BCP-MCNC: 3.9 G/DL (ref 3.5–5)
ALP SERPL-CCNC: 93 U/L (ref 46–116)
ALT SERPL W P-5'-P-CCNC: 48 U/L (ref 12–78)
ANION GAP SERPL CALCULATED.3IONS-SCNC: 7 MMOL/L (ref 4–13)
AST SERPL W P-5'-P-CCNC: 38 U/L (ref 5–45)
BASOPHILS # BLD AUTO: 0.08 THOUSANDS/ΜL (ref 0–0.1)
BASOPHILS NFR BLD AUTO: 1 % (ref 0–1)
BILIRUB SERPL-MCNC: 0.49 MG/DL (ref 0.2–1)
BUN SERPL-MCNC: 21 MG/DL (ref 5–25)
CALCIUM SERPL-MCNC: 9.9 MG/DL (ref 8.3–10.1)
CHLORIDE SERPL-SCNC: 104 MMOL/L (ref 100–108)
CHOLEST SERPL-MCNC: 172 MG/DL
CO2 SERPL-SCNC: 26 MMOL/L (ref 21–32)
CREAT SERPL-MCNC: 1.15 MG/DL (ref 0.6–1.3)
EOSINOPHIL # BLD AUTO: 0.35 THOUSAND/ΜL (ref 0–0.61)
EOSINOPHIL NFR BLD AUTO: 4 % (ref 0–6)
ERYTHROCYTE [DISTWIDTH] IN BLOOD BY AUTOMATED COUNT: 15.8 % (ref 11.6–15.1)
GFR SERPL CREATININE-BSD FRML MDRD: 66 ML/MIN/1.73SQ M
GLUCOSE P FAST SERPL-MCNC: 86 MG/DL (ref 65–99)
HCT VFR BLD AUTO: 48.3 % (ref 36.5–49.3)
HDLC SERPL-MCNC: 59 MG/DL
HGB BLD-MCNC: 14.3 G/DL (ref 12–17)
IMM GRANULOCYTES # BLD AUTO: 0.02 THOUSAND/UL (ref 0–0.2)
IMM GRANULOCYTES NFR BLD AUTO: 0 % (ref 0–2)
LDLC SERPL CALC-MCNC: 96 MG/DL (ref 0–100)
LYMPHOCYTES # BLD AUTO: 2.83 THOUSANDS/ΜL (ref 0.6–4.47)
LYMPHOCYTES NFR BLD AUTO: 31 % (ref 14–44)
MCH RBC QN AUTO: 25.3 PG (ref 26.8–34.3)
MCHC RBC AUTO-ENTMCNC: 29.6 G/DL (ref 31.4–37.4)
MCV RBC AUTO: 85 FL (ref 82–98)
MONOCYTES # BLD AUTO: 0.87 THOUSAND/ΜL (ref 0.17–1.22)
MONOCYTES NFR BLD AUTO: 10 % (ref 4–12)
NEUTROPHILS # BLD AUTO: 4.97 THOUSANDS/ΜL (ref 1.85–7.62)
NEUTS SEG NFR BLD AUTO: 54 % (ref 43–75)
NONHDLC SERPL-MCNC: 113 MG/DL
NRBC BLD AUTO-RTO: 0 /100 WBCS
PLATELET # BLD AUTO: 372 THOUSANDS/UL (ref 149–390)
PMV BLD AUTO: 10.3 FL (ref 8.9–12.7)
POTASSIUM SERPL-SCNC: 4 MMOL/L (ref 3.5–5.3)
PROT SERPL-MCNC: 8.9 G/DL (ref 6.4–8.2)
RBC # BLD AUTO: 5.66 MILLION/UL (ref 3.88–5.62)
SODIUM SERPL-SCNC: 137 MMOL/L (ref 136–145)
TRIGL SERPL-MCNC: 85 MG/DL
WBC # BLD AUTO: 9.12 THOUSAND/UL (ref 4.31–10.16)

## 2022-01-28 PROCEDURE — 80061 LIPID PANEL: CPT

## 2022-01-28 PROCEDURE — 85025 COMPLETE CBC W/AUTO DIFF WBC: CPT

## 2022-01-28 PROCEDURE — 36415 COLL VENOUS BLD VENIPUNCTURE: CPT

## 2022-01-28 PROCEDURE — 80053 COMPREHEN METABOLIC PANEL: CPT

## 2022-02-02 ENCOUNTER — TELEPHONE (OUTPATIENT)
Dept: PHYSICAL THERAPY | Facility: OTHER | Age: 67
End: 2022-02-02

## 2022-02-02 NOTE — TELEPHONE ENCOUNTER
Message left for patient regarding referral entered for [facility]  Nurse requested patient CB if needed and leave Full Name &  on VM  One of the nurses will return the call to discuss the program further      Referral deferred for f/u attempt

## 2022-02-09 ENCOUNTER — TELEPHONE (OUTPATIENT)
Dept: PHYSICAL THERAPY | Facility: OTHER | Age: 67
End: 2022-02-09

## 2022-02-09 NOTE — TELEPHONE ENCOUNTER
Nurse reached out to discuss recent referral entered for [facility] and offerings  Nurse discussed the program and referrals in pts EMR  Patients shoulder issue has resolved  Nurse reviewed triage and referral process for PT eval for his back pain  Patient declined to participate in the program at this time  Patient thanked nurse for call and will CB if needed  Nurse respected pts decision  Nurse confirmed patient has [facility] contact information and encouraged to call program back if needed in the future  Patient agreed and very appreciative of call and discussion  Nurse wished him well and referral closed per protocol

## 2022-02-16 ENCOUNTER — TELEPHONE (OUTPATIENT)
Dept: FAMILY MEDICINE CLINIC | Facility: CLINIC | Age: 67
End: 2022-02-16

## 2022-02-16 DIAGNOSIS — S58.112D BELOW-ELBOW AMPUTATION OF LEFT UPPER EXTREMITY, SUBSEQUENT ENCOUNTER: Primary | ICD-10-CM

## 2022-02-16 NOTE — TELEPHONE ENCOUNTER
Yoli Demarco,     I placed order for PT  Please fax to [facility] [phone]  Did they mean they also need an order for an actual prosthesis or just the order for PT for him to use a prosthesis he already has?

## 2022-02-16 NOTE — TELEPHONE ENCOUNTER
Natalee () [id number] [address] called office today 02/16/22 requesting a Script for a Prosthesis for PT  Miss Albright provided her contact # if further information is needed in regards of the request        Please contact Miss Albright @ [phone]

## 2022-02-17 NOTE — TELEPHONE ENCOUNTER
02/17/22    Colin Bullard,     I called Miss Albright, and I clarified the information of the Request     Miss Albright stated that Pt needs an Order Script for a Prosthesis  Pt was Advised by [facility] to reach out to his PCP for the Script, since Pt hasn't seen any specialist for the prosthesis  Miss Albright is requesting an Order that states Below the Elbow Prosthetic Script  She also provided a Fax # [phone] to [facility]

## 2022-03-03 ENCOUNTER — TELEPHONE (OUTPATIENT)
Dept: FAMILY MEDICINE CLINIC | Facility: CLINIC | Age: 67
End: 2022-03-03

## 2022-03-03 NOTE — TELEPHONE ENCOUNTER
Natalee called the office to inform that prosthetic order needs to be signed or cosigned by a doctor because insurance will not approve it if it is only signed by a CRNP

## 2022-03-08 NOTE — TELEPHONE ENCOUNTER
Natalee from [facility] called again regarding prior-auth for the prosthetic script that needs to be signed by an MD OR CO-SIGNED  They won't approve it if it's not done  Please advise  Please call natalee at [phone] at [facility]      QIQ:[phone]

## 2022-03-10 ENCOUNTER — TELEPHONE (OUTPATIENT)
Dept: FAMILY MEDICINE CLINIC | Facility: CLINIC | Age: 67
End: 2022-03-10

## 2022-03-10 NOTE — TELEPHONE ENCOUNTER
Placed in Dr Krishna Blake did contact our office to let us know the original order I placed needed MD or DO signature

## 2022-03-10 NOTE — TELEPHONE ENCOUNTER
PCP SIGNATURE NEEDED FOR  Clinic FORM RECEIVED VIA FAX AND PLACED IN PCP FOLDER TO BE DELIVERED AT ASSIGNED TIMES        Prescription- Letter/Certificate of Medical Necessity    Diagnosis [id number]

## 2022-11-28 ENCOUNTER — HOSPITAL ENCOUNTER (EMERGENCY)
Facility: HOSPITAL | Age: 67
Discharge: HOME | End: 2022-11-28
Attending: INTERNAL MEDICINE

## 2022-11-28 VITALS
TEMPERATURE: 98.3 F | WEIGHT: 189.82 LBS | BODY MASS INDEX: 27.24 KG/M2 | SYSTOLIC BLOOD PRESSURE: 166 MMHG | RESPIRATION RATE: 18 BRPM | DIASTOLIC BLOOD PRESSURE: 96 MMHG | OXYGEN SATURATION: 97 % | HEART RATE: 102 BPM

## 2022-11-28 DIAGNOSIS — M54.2 NECK PAIN: Primary | ICD-10-CM

## 2022-11-28 DIAGNOSIS — M43.6 NECK STIFFNESS: ICD-10-CM

## 2022-11-28 DIAGNOSIS — M62.838 TRAPEZIUS MUSCLE SPASM: ICD-10-CM

## 2022-11-28 RX ORDER — LIDOCAINE 50 MG/G
1 PATCH TOPICAL ONCE
Status: DISCONTINUED | OUTPATIENT
Start: 2022-11-28 | End: 2022-11-28 | Stop reason: HOSPADM

## 2022-11-28 RX ORDER — NAPROXEN 500 MG/1
500 TABLET ORAL 2 TIMES DAILY WITH MEALS
Qty: 30 TABLET | Refills: 0 | Status: SHIPPED | OUTPATIENT
Start: 2022-11-28

## 2022-11-28 RX ORDER — ACETAMINOPHEN 325 MG/1
650 TABLET ORAL ONCE
Status: COMPLETED | OUTPATIENT
Start: 2022-11-28 | End: 2022-11-28

## 2022-11-28 RX ORDER — KETOROLAC TROMETHAMINE 30 MG/ML
30 INJECTION, SOLUTION INTRAMUSCULAR; INTRAVENOUS ONCE
Status: COMPLETED | OUTPATIENT
Start: 2022-11-28 | End: 2022-11-28

## 2022-11-28 RX ORDER — CYCLOBENZAPRINE HCL 10 MG
10 TABLET ORAL 2 TIMES DAILY PRN
Qty: 20 TABLET | Refills: 0 | Status: SHIPPED | OUTPATIENT
Start: 2022-11-28

## 2022-11-28 RX ORDER — LIDOCAINE 40 MG/G
CREAM TOPICAL AS NEEDED
Qty: 30 G | Refills: 0 | Status: SHIPPED | OUTPATIENT
Start: 2022-11-28

## 2022-11-28 RX ADMIN — ACETAMINOPHEN 650 MG: 325 TABLET ORAL at 07:59

## 2022-11-28 RX ADMIN — KETOROLAC TROMETHAMINE 30 MG: 30 INJECTION, SOLUTION INTRAMUSCULAR; INTRAVENOUS at 07:59

## 2022-11-28 RX ADMIN — LIDOCAINE 1 PATCH: 50 PATCH TOPICAL at 07:58

## 2022-11-28 NOTE — ED PROVIDER NOTES
History  Chief Complaint   Patient presents with   • Neck Pain     Neck stiffness and pain x5 days; took advil yesterday with no relief     HPI  23-year-old man presents to ED for evaluation of bilateral neck and upper back pain and stiffness  Reports pain and stiffness for the last 5 days  Denies any trauma or falls  Denies any increased activities  Denies lifting any heavy objects  He states he felt like this before but not for this long  He took Advil once, he took it last night  He denies any improvement of pain after Advil  He has not tried any other medications or therapies  He states the pain is worse when he rotates his neck or shruggles shoulders  He denies any alleviating factors  Patient denies headache, visual changes, dizziness, fevers, chills, chest pain, palpitations, SOB, abdominal pain, diarrhea, new skin rashes or numbness or tingling of the extremities  Prior to Admission Medications   Prescriptions Last Dose Informant Patient Reported? Taking? cyclobenzaprine (FLEXERIL) 10 mg tablet   No No   Sig: Take 1 tablet (10 mg total) by mouth 2 (two) times a day as needed for muscle spasms   multivitamin-iron-minerals-folic acid (CENTRUM) chewable tablet   Yes No   Sig: Chew 1 tablet daily      Facility-Administered Medications: None       Past Medical History:   Diagnosis Date   • Acute pericarditis 11/24/2016   • Pericarditis        History reviewed  No pertinent surgical history  History reviewed  No pertinent family history  I have reviewed and agree with the history as documented      E-Cigarette/Vaping   • E-Cigarette Use Never User      E-Cigarette/Vaping Substances     Social History     Tobacco Use   • Smoking status: Some Days     Types: Cigars   • Smokeless tobacco: Never   Vaping Use   • Vaping Use: Never used   Substance Use Topics   • Alcohol use: Yes     Comment: socially   • Drug use: No       Review of Systems   All other systems reviewed and are negative  Physical Exam  Physical Exam  Vitals and nursing note reviewed  Constitutional:       General: He is not in acute distress  Appearance: He is well-developed  HENT:      Head: Normocephalic and atraumatic  Eyes:      Conjunctiva/sclera: Conjunctivae normal    Neck:      Vascular: No carotid bruit  Trachea: Trachea and phonation normal  No tracheal tenderness or tracheal deviation  Meningeal: Brudzinski's sign and Kernig's sign absent  Cardiovascular:      Rate and Rhythm: Normal rate and regular rhythm  Heart sounds: No murmur heard  Pulmonary:      Effort: Pulmonary effort is normal  No respiratory distress  Breath sounds: Normal breath sounds  Abdominal:      Palpations: Abdomen is soft  Tenderness: There is no abdominal tenderness  Musculoskeletal:         General: No swelling  Cervical back: Neck supple  Tenderness present  No edema, erythema, signs of trauma, rigidity or crepitus  Pain with movement and muscular tenderness present  No spinous process tenderness  Decreased range of motion  Back:       Comments: TTP   Lymphadenopathy:      Cervical: No cervical adenopathy  Skin:     General: Skin is warm and dry  Capillary Refill: Capillary refill takes less than 2 seconds  Neurological:      Mental Status: He is alert     Psychiatric:         Mood and Affect: Mood normal          Vital Signs  ED Triage Vitals   Temperature Pulse Respirations Blood Pressure SpO2   11/28/22 0728 11/28/22 0728 11/28/22 0728 11/28/22 0728 11/28/22 0728   98 3 °F (36 8 °C) 102 18 166/96 97 %      Temp Source Heart Rate Source Patient Position - Orthostatic VS BP Location FiO2 (%)   11/28/22 0728 11/28/22 0728 11/28/22 0728 11/28/22 0728 --   Oral Monitor Sitting Left arm       Pain Score       11/28/22 0759       7           Vitals:    11/28/22 0728   BP: 166/96   Pulse: 102   Patient Position - Orthostatic VS: Sitting         Visual Acuity      ED Medications  Medications   lidocaine (LIDODERM) 5 % patch 1 patch (1 patch Topical Medication Applied 11/28/22 0758)   ketorolac (TORADOL) injection 30 mg (30 mg Intramuscular Given 11/28/22 0759)   acetaminophen (TYLENOL) tablet 650 mg (650 mg Oral Given 11/28/22 0759)       Diagnostic Studies  Results Reviewed     None                 No orders to display              Procedures  Procedures         ED Course                               SBIRT 20yo+    Flowsheet Row Most Recent Value   SBIRT (25 yo +)    In order to provide better care to our patients, we are screening all of our patients for alcohol and drug use  Would it be okay to ask you these screening questions? No Filed at: 11/28/2022 0917                    MDM  Number of Diagnoses or Management Options  Neck pain  Trapezius muscle spasm  Diagnosis management comments: Will treat symptomatically and will reassess  Discussed symptomatic treatment home and patient voiced understanding  Disposition  Final diagnoses:   Neck pain   Trapezius muscle spasm   Neck stiffness     Time reflects when diagnosis was documented in both MDM as applicable and the Disposition within this note     Time User Action Codes Description Comment    11/28/2022  8:28 AM Gerold Eves Add [M54 2] Neck pain     11/28/2022  8:28 AM Gerold Eves Add [O45 362] Trapezius muscle spasm     11/28/2022  8:29 AM Gerold Eves Add [M43 6] Neck stiffness       ED Disposition     ED Disposition   Discharge    Condition   Stable    Date/Time   Mon Nov 28, 2022  8:28 AM    Comment   [place] Al discharge to home/self care                 Follow-up Information     Follow up With Specialties Details Why Contact Info    Lyly Talley, [address] Nurse Practitioner Schedule an appointment as soon as possible for a visit in 1 week If symptoms worsen [address]  [address] [facility]  [address]             Patient's Medications   Discharge Prescriptions CYCLOBENZAPRINE (FLEXERIL) 10 MG TABLET    Take 1 tablet (10 mg total) by mouth 2 (two) times a day as needed for muscle spasms       Start Date: 11/28/2022End Date: --       Order Dose: 10 mg       Quantity: 20 tablet    Refills: 0    LIDOCAINE (LMX) 4 % CREAM    Apply topically as needed for mild pain       Start Date: 11/28/2022End Date: --       Order Dose: --       Quantity: 30 g    Refills: 0    NAPROXEN (NAPROSYN) 500 MG TABLET    Take 1 tablet (500 mg total) by mouth 2 (two) times a day with meals       Start Date: 11/28/2022End Date: --       Order Dose: 500 mg       Quantity: 30 tablet    Refills: 0       No discharge procedures on file      PDMP Review     None          ED Provider  Electronically Signed by           Vickie Mon MD  11/28/22 7086

## 2023-02-17 ENCOUNTER — HOSPITAL ENCOUNTER (EMERGENCY)
Facility: HOSPITAL | Age: 68
Discharge: HOME | End: 2023-02-17
Attending: STUDENT IN AN ORGANIZED HEALTH CARE EDUCATION/TRAINING PROGRAM | Admitting: STUDENT IN AN ORGANIZED HEALTH CARE EDUCATION/TRAINING PROGRAM

## 2023-02-17 VITALS
WEIGHT: 189.6 LBS | TEMPERATURE: 98.6 F | SYSTOLIC BLOOD PRESSURE: 144 MMHG | RESPIRATION RATE: 18 BRPM | OXYGEN SATURATION: 97 % | HEART RATE: 104 BPM | DIASTOLIC BLOOD PRESSURE: 81 MMHG | HEIGHT: 70 IN | BODY MASS INDEX: 27.14 KG/M2

## 2023-02-17 DIAGNOSIS — L30.9 DERMATITIS: Primary | ICD-10-CM

## 2023-02-17 RX ORDER — HYDROXYZINE PAMOATE 25 MG/1
25 CAPSULE ORAL 3 TIMES DAILY PRN
Qty: 30 CAPSULE | Refills: 0 | Status: SHIPPED | OUTPATIENT
Start: 2023-02-17

## 2023-02-17 RX ORDER — PREDNISONE 10 MG/1
TABLET ORAL
Qty: 20 TABLET | Refills: 0 | Status: SHIPPED | OUTPATIENT
Start: 2023-02-17 | End: 2023-02-17 | Stop reason: SDUPTHER

## 2023-02-17 RX ORDER — HYDROXYZINE PAMOATE 25 MG/1
25 CAPSULE ORAL 3 TIMES DAILY PRN
Qty: 30 CAPSULE | Refills: 0 | Status: SHIPPED | OUTPATIENT
Start: 2023-02-17 | End: 2023-02-17 | Stop reason: SDUPTHER

## 2023-02-17 RX ORDER — PREDNISONE 10 MG/1
TABLET ORAL
Qty: 20 TABLET | Refills: 0 | Status: SHIPPED | OUTPATIENT
Start: 2023-02-17

## 2023-02-17 NOTE — ED PROVIDER NOTES
History  Chief Complaint   Patient presents with   • Rash     Rash started on face Wednesday and spread to arms, neck and stomach; no trouble with breathing; but itches and keeps spread     Pt with rash to face torso and arms x 3-4 days  Getting worse   +itching       Rash  Location: genital   Quality: itchiness    Severity:  Mild  Onset quality:  Gradual  Duration:  3 days  Timing:  Constant  Progression:  Unchanged  Chronicity:  New  Context: not animal contact    Relieved by:  Nothing  Worsened by:  Nothing  Ineffective treatments:  None tried  Associated symptoms: no abdominal pain        Prior to Admission Medications   Prescriptions Last Dose Informant Patient Reported? Taking? cyclobenzaprine (FLEXERIL) 10 mg tablet   No No   Sig: Take 1 tablet (10 mg total) by mouth 2 (two) times a day as needed for muscle spasms   cyclobenzaprine (FLEXERIL) 10 mg tablet   No No   Sig: Take 1 tablet (10 mg total) by mouth 2 (two) times a day as needed for muscle spasms   lidocaine (LMX) 4 % cream   No No   Sig: Apply topically as needed for mild pain   multivitamin-iron-minerals-folic acid (CENTRUM) chewable tablet   Yes No   Sig: Chew 1 tablet daily   naproxen (Naprosyn) 500 mg tablet   No No   Sig: Take 1 tablet (500 mg total) by mouth 2 (two) times a day with meals      Facility-Administered Medications: None       Past Medical History:   Diagnosis Date   • Acute pericarditis 11/24/2016   • 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   • Pericarditis        Past Surgical History:   Procedure Laterality Date   • AMPUTATION ARM Left     below elbow       History reviewed  No pertinent family history  I have reviewed and agree with the history as documented      E-Cigarette/Vaping   • E-Cigarette Use Never User      E-Cigarette/Vaping Substances     Social History     Tobacco Use   • Smoking status: Some Days     Types: Cigars   • Smokeless tobacco: Never   Vaping Use   • Vaping Use: Never used   Substance Use Topics   • Alcohol use: Yes     Comment: socially   • Drug use: No       Review of Systems   Constitutional: Negative  HENT: Negative  Eyes: Negative  Respiratory: Negative  Cardiovascular: Negative  Gastrointestinal: Negative  Negative for abdominal pain  Endocrine: Negative  Genitourinary: Negative  Musculoskeletal: Negative  Skin: Positive for rash  Allergic/Immunologic: Negative  Neurological: Negative  Hematological: Negative  Psychiatric/Behavioral: Negative  All other systems reviewed and are negative  Physical Exam  Physical Exam  Vitals and nursing note reviewed  Constitutional:       Appearance: Normal appearance  He is normal weight  HENT:      Head: Normocephalic and atraumatic  Right Ear: Tympanic membrane, ear canal and external ear normal       Left Ear: Tympanic membrane, ear canal and external ear normal       Nose: Nose normal       Mouth/Throat:      Mouth: Mucous membranes are moist       Pharynx: Oropharynx is clear  Eyes:      Extraocular Movements: Extraocular movements intact  Conjunctiva/sclera: Conjunctivae normal       Pupils: Pupils are equal, round, and reactive to light  Cardiovascular:      Rate and Rhythm: Normal rate and regular rhythm  Pulses: Normal pulses  Heart sounds: Normal heart sounds  Pulmonary:      Effort: Pulmonary effort is normal       Breath sounds: Normal breath sounds  Abdominal:      General: Abdomen is flat  Bowel sounds are normal       Palpations: Abdomen is soft  Musculoskeletal:         General: Normal range of motion  Cervical back: Normal range of motion and neck supple  Skin:     General: Skin is warm  Capillary Refill: Capillary refill takes less than 2 seconds  Comments: + blanching dermatitis maculopapular to face torso arms    No palm no soles  Pharynx and palate wnl   No petecchaie no purpura   Neurological:      General: No focal deficit present        Mental Status: He is alert and oriented to person, place, and time  Psychiatric:         Mood and Affect: Mood normal          Vital Signs  ED Triage Vitals [02/17/23 0833]   Temperature Pulse Respirations Blood Pressure SpO2   98 6 °F (37 °C) 104 18 144/81 97 %      Temp Source Heart Rate Source Patient Position - Orthostatic VS BP Location FiO2 (%)   Oral Monitor Sitting Right arm --      Pain Score       No Pain           Vitals:    02/17/23 0833   BP: 144/81   Pulse: 104   Patient Position - Orthostatic VS: Sitting         Visual Acuity      ED Medications  Medications - No data to display    Diagnostic Studies  Results Reviewed     None                 No orders to display              Procedures  Procedures         ED Course                                             MDM    Disposition  Final diagnoses:   Dermatitis     Time reflects when diagnosis was documented in both MDM as applicable and the Disposition within this note     Time User Action Codes Description Comment    2/17/2023  9:40 AM Edward Pedroza  Add [L30 9] Dermatitis       ED Disposition     ED Disposition   Discharge    Condition   Stable    Date/Time   Fri Feb 17, 2023  9:36 AM    Comment   Tyler Sánchez discharge to home/self care                 Follow-up Information     Follow up With Specialties Details Why [address]  [phone]            Discharge Medication List as of 2/17/2023  9:46 AM      START taking these medications    Details   hydrOXYzine pamoate (VISTARIL) 25 mg capsule Take 1 capsule (25 mg total) by mouth 3 (three) times a day as needed for itching, Starting Fri 2/17/2023, Print      predniSONE 10 mg tablet 4 tabs po qd x 2 days then 3 tabs po qd x 2 days then 2 tabs po qd x 2 days then 1 tab po qd x 2 days, Print         CONTINUE these medications which have NOT CHANGED    Details   !! cyclobenzaprine (FLEXERIL) 10 mg tablet Take 1 tablet (10 mg total) by mouth 2 (two) times a day as needed for muscle spasms, Starting Tue 1/25/2022, Normal      !! cyclobenzaprine (FLEXERIL) 10 mg tablet Take 1 tablet (10 mg total) by mouth 2 (two) times a day as needed for muscle spasms, Starting Mon 11/28/2022, Normal      lidocaine (LMX) 4 % cream Apply topically as needed for mild pain, Starting Mon 11/28/2022, Normal      multivitamin-iron-minerals-folic acid (CENTRUM) chewable tablet Chew 1 tablet daily, Until Discontinued, Historical Med      naproxen (Naprosyn) 500 mg tablet Take 1 tablet (500 mg total) by mouth 2 (two) times a day with meals, Starting Mon 11/28/2022, Normal       !! - Potential duplicate medications found  Please discuss with provider  No discharge procedures on file      PDMP Review     None          ED Provider  Electronically Signed by           Deshawn Benitez PA-C  02/17/23 6399

## 2023-03-20 ENCOUNTER — OFFICE VISIT (OUTPATIENT)
Dept: FAMILY MEDICINE CLINIC | Facility: CLINIC | Age: 68
End: 2023-03-20

## 2023-03-20 VITALS
WEIGHT: 191 LBS | DIASTOLIC BLOOD PRESSURE: 84 MMHG | BODY MASS INDEX: 27.35 KG/M2 | RESPIRATION RATE: 18 BRPM | HEIGHT: 70 IN | HEART RATE: 102 BPM | TEMPERATURE: 97.6 F | SYSTOLIC BLOOD PRESSURE: 138 MMHG | OXYGEN SATURATION: 98 %

## 2023-03-20 DIAGNOSIS — L24.9 IRRITANT DERMATITIS: Primary | ICD-10-CM

## 2023-03-20 RX ORDER — HYDROXYZINE PAMOATE 25 MG/1
25 CAPSULE ORAL 3 TIMES DAILY PRN
Qty: 15 CAPSULE | Refills: 0 | Status: SHIPPED | OUTPATIENT
Start: 2023-03-20

## 2023-03-20 NOTE — PROGRESS NOTES
Name: Shira Scott      : 1955      MRN: [id number]  Encounter Provider: Monik Swanson MD  Encounter Date: 3/20/2023   Encounter department: [address] [place] [address]  Irritant dermatitis  Assessment & Plan:  Suspected secondary to change in body wash, only moisturizer to dose maintenance still with color and recent additives  - Hydroxyzine 25 mg 3 times daily as needed for generalized itching  -Hydrocortisone cream [patient has at home] as needed for local irritation  -Recommend avoidance of all scented/colour added skin products,, laundry detergents  Also recommended to cook food food and adding on use of, pepper and cut seasoning such as garlic onion for the next 4 weeks  Avoid TV dinners during this time  -Review in 4 weeks    Orders:  -     hydrOXYzine pamoate (VISTARIL) 25 mg capsule; Take 1 capsule (25 mg total) by mouth 3 (three) times a day as needed for itching         Subjective      Shira Scott is a very pleasant 78 y o  male who presents today to review rash for which he presented to ED on  and received prednisone taper and hydroxyzine  Patient report full compliance on medications prescribed with no side effects experienced at this time  He is not taking any medications over-the-counter or supplements apart from that listed in his medication list   He has had no admissions to hospital since the last visit to our office  Today, he reports improvement of his rash during his prednisone taper course however reoccurrence after prednisone completed  Denies any environmental allergen contact such as poison ivy, ill contacts in the home  Patient reports has been using Tide hypoallergenic detergent but need recent change in bath soap 4 weeks before his rash first occurred from only moisturizing soap to [place] for men which is colored and scented  He also continues frozen dinners most often but this is not a recent change for him  His rash consists of red wheal like lesion throughout his body however most notably on his left upper limb stump, anterior torso, face and then more sparing lesions noted to right upper extremity and lower extremities bilaterally  Patient reports genital area and back are less affected  Denies any significant irritation, pruritus and has no pain  Denies any fever, darkly colored urine for hematuria  Review of Systems   Constitutional: Negative for appetite change, chills, fever and unexpected weight change  HENT: Negative for congestion, ear pain, rhinorrhea and sore throat  Eyes: Negative for visual disturbance  Respiratory: Negative for cough, chest tightness, shortness of breath and wheezing  Cardiovascular: Negative for chest pain, palpitations and leg swelling  Gastrointestinal: Negative for abdominal pain, constipation, diarrhea, nausea and vomiting  Genitourinary: Negative for dysuria, frequency, hematuria and urgency  Musculoskeletal: Negative for arthralgias, back pain and myalgias  Skin: Positive for rash  Neurological: Negative for dizziness and headaches  Current Outpatient Medications on File Prior to Visit   Medication Sig   • multivitamin-iron-minerals-folic acid (CENTRUM) chewable tablet Chew 1 tablet daily       Objective     /84 (BP Location: Right arm, Patient Position: Sitting, Cuff Size: Standard)   Pulse 102   Temp 97 6 °F (36 4 °C) (Temporal)   Resp 18   Ht 5' 10" (1 778 m)   Wt 86 6 kg (191 lb)   SpO2 98%   BMI 27 41 kg/m²     Physical Exam  Vitals reviewed  Constitutional:       General: He is not in acute distress  Appearance: He is normal weight  Eyes:      Conjunctiva/sclera: Conjunctivae normal    Cardiovascular:      Rate and Rhythm: Normal rate  Pulmonary:      Effort: Pulmonary effort is normal    Musculoskeletal:         General: Normal range of motion  Skin:     General: Skin is dry        Findings: Rash (Erythematous wheal like lesions as per HPI description  No excoriations noted  No burrows noted  No increased warmth noted ) present  Neurological:      General: No focal deficit present  Mental Status: He is alert     Psychiatric:         Behavior: Behavior normal                    Pernell Winchester MD

## 2023-03-21 PROBLEM — L24.9 IRRITANT DERMATITIS: Status: ACTIVE | Noted: 2023-03-21

## 2023-03-21 NOTE — ASSESSMENT & PLAN NOTE
Suspected secondary to change in body wash, only moisturizer to dose maintenance still with color and recent additives  - Hydroxyzine 25 mg 3 times daily as needed for generalized itching  -Hydrocortisone cream [patient has at home] as needed for local irritation  -Recommend avoidance of all scented/colour added skin products,, laundry detergents  Also recommended to cook food food and adding on use of, pepper and cut seasoning such as garlic onion for the next 4 weeks  Avoid TV dinners during this time    -Review in 4 weeks

## 2024-09-27 ENCOUNTER — HOSPITAL ENCOUNTER (EMERGENCY)
Facility: HOSPITAL | Age: 69
Discharge: HOME | End: 2024-09-27
Payer: MEDICARE

## 2024-09-27 ENCOUNTER — APPOINTMENT (EMERGENCY)
Dept: RADIOLOGY | Facility: HOSPITAL | Age: 69
End: 2024-09-27
Payer: MEDICARE

## 2024-09-27 VITALS
OXYGEN SATURATION: 95 % | BODY MASS INDEX: 26.62 KG/M2 | DIASTOLIC BLOOD PRESSURE: 78 MMHG | HEART RATE: 100 BPM | RESPIRATION RATE: 20 BRPM | SYSTOLIC BLOOD PRESSURE: 137 MMHG | TEMPERATURE: 98.1 F | WEIGHT: 185.5 LBS

## 2024-09-27 DIAGNOSIS — M25.531 ACUTE PAIN OF RIGHT WRIST: Primary | ICD-10-CM

## 2024-09-27 PROCEDURE — 73110 X-RAY EXAM OF WRIST: CPT

## 2024-09-27 PROCEDURE — 99283 EMERGENCY DEPT VISIT LOW MDM: CPT

## 2024-09-27 PROCEDURE — 99284 EMERGENCY DEPT VISIT MOD MDM: CPT

## 2024-09-27 RX ORDER — IBUPROFEN 600 MG/1
600 TABLET, FILM COATED ORAL ONCE
Status: COMPLETED | OUTPATIENT
Start: 2024-09-27 | End: 2024-09-27

## 2024-09-27 RX ORDER — ACETAMINOPHEN 325 MG/1
975 TABLET ORAL ONCE
Status: COMPLETED | OUTPATIENT
Start: 2024-09-27 | End: 2024-09-27

## 2024-09-27 RX ADMIN — ACETAMINOPHEN 975 MG: 325 TABLET ORAL at 09:38

## 2024-09-27 RX ADMIN — IBUPROFEN 600 MG: 600 TABLET, FILM COATED ORAL at 09:38

## 2024-09-27 NOTE — ED PROVIDER NOTES
Final diagnoses:   Acute pain of right wrist     ED Disposition       ED Disposition   Discharge    Condition   Stable    Date/Time   Fri Sep 27, 2024 10:11 AM    Comment   Ha Hardy discharge to home/self care.                   Assessment & Plan       Medical Decision Making  Amount and/or Complexity of Data Reviewed  Radiology: ordered.    Risk  OTC drugs.  Prescription drug management.      70 y/o M hx of left hand amputation presenting with acute R wrist pain. VSS. Appears likely overuse type injury, no evidence of bony tenderness, normal pulse, normal cap refill, no edema or s/s compartment syndrome. Recommend wrist brace and ortho f/u. Pt agreeable with plan. RTED precautions given.        Medications   ibuprofen (MOTRIN) tablet 600 mg (600 mg Oral Given 9/27/24 0938)   acetaminophen (TYLENOL) tablet 975 mg (975 mg Oral Given 9/27/24 0938)       ED Risk Strat Scores                           SBIRT 22yo+      Flowsheet Row Most Recent Value   Initial Alcohol Screen: US AUDIT-C     1. How often do you have a drink containing alcohol? 1 Filed at: 09/27/2024 0944   2. How many drinks containing alcohol do you have on a typical day you are drinking?  0 Filed at: 09/27/2024 0944   3a. Male UNDER 65: How often do you have five or more drinks on one occasion? 1 Filed at: 09/27/2024 0944   3b. FEMALE Any Age, or MALE 65+: How often do you have 4 or more drinks on one occassion? 0 Filed at: 09/27/2024 0944   Audit-C Score 2 Filed at: 09/27/2024 0944   VANESSA: How many times in the past year have you...    Used an illegal drug or used a prescription medication for non-medical reasons? Never Filed at: 09/27/2024 0944                            History of Present Illness       Chief Complaint   Patient presents with    Wrist Pain     Rt wrist pain for a few days. 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 No pertinent family history.   Social History     Tobacco Use    Smoking status: Some Days     Types: Cigars     Passive exposure: Current    Smokeless tobacco: Never   Vaping Use    Vaping status: Never Used   Substance Use Topics    Alcohol use: Yes     Comment: socially    Drug use: No      E-Cigarette/Vaping    E-Cigarette Use Never User       E-Cigarette/Vaping Substances      I have reviewed and agree with the history as documented.     HPI    70 y/o M presenting with R wrist pain for the past few days. Pt denies any direct trauma to the area. Hx of L wrist/hand amputation, travels via motorized scooter, thinks maybe the angle he is holding the handle is causing the pain. No numbness/tingling.     Review of Systems   Constitutional:  Negative for chills and fever.   HENT:  Negative for ear pain and sore throat.    Eyes:  Negative for pain and visual disturbance.   Respiratory:  Negative for cough and shortness of breath.    Cardiovascular:  Negative for chest pain and palpitations.   Gastrointestinal:  Negative for abdominal pain and vomiting.   Genitourinary:  Negative for dysuria and hematuria.   Musculoskeletal:  Negative for arthralgias and back pain.   Skin:  Negative for color change and rash.   Neurological:  Negative for seizures and syncope.   All other systems reviewed and are negative.          Objective       ED Triage Vitals   Temperature Pulse Blood Pressure Respirations SpO2 Patient Position - Orthostatic VS   09/27/24 0916 09/27/24 0916 09/27/24 0916 09/27/24 0916 09/27/24 0916 09/27/24 0916   98.1 °F (36.7 °C) 100 137/78 20 95 % Sitting      Temp src Heart Rate Source BP Location FiO2 (%) Pain Score    -- 09/27/24 0916 09/27/24 0916 -- 09/27/24 0938     Monitor Left arm  6       Vitals      Date and Time Temp Pulse SpO2 Resp BP Pain Score FACES Pain Rating User   09/27/24 0938 -- -- -- -- -- 6 -- KB   09/27/24 0916 98.1 °F (36.7 °C) 100 95 % 20 137/78 -- --             Physical Exam  Vitals and nursing note reviewed.   Constitutional:       General: He is not in acute distress.     Appearance: He is well-developed. He is not toxic-appearing.   HENT:      Head: Normocephalic and atraumatic.      Right Ear: External ear normal.      Left Ear: External ear normal.      Nose: Nose normal.      Mouth/Throat:      Pharynx: Oropharynx is clear. No oropharyngeal exudate or posterior oropharyngeal erythema.   Eyes:      Extraocular Movements: Extraocular movements intact.      Conjunctiva/sclera: Conjunctivae normal.      Pupils: Pupils are equal, round, and reactive to light.   Cardiovascular:      Rate and Rhythm: Normal rate and regular rhythm.      Pulses: Normal pulses.      Heart sounds: Normal heart sounds. No murmur heard.     No friction rub. No gallop.   Pulmonary:      Effort: Pulmonary effort is normal. No respiratory distress.      Breath sounds: Normal breath sounds. No wheezing, rhonchi or rales.   Abdominal:      General: Abdomen is flat.      Palpations: Abdomen is soft.      Tenderness: There is no abdominal tenderness. There is no guarding or rebound.   Musculoskeletal:         General: Tenderness present.      Cervical back: Normal range of motion. No rigidity.      Right lower leg: No edema.      Left lower leg: No edema.      Comments: Multiple amputations noted of left hand, some fingers of right hand.   R wrist has small bruise overlying the distal radius just proximal to thenar eminence. No bony tenderness in the wrist or forearm. Able to flex and extend, some pain with passive extension    Skin:     General: Skin is warm and dry.      Capillary Refill: Capillary refill takes less than 2 seconds.   Neurological:      General: No focal deficit present.      Mental  Status: He is alert.   Psychiatric:         Mood and Affect: Mood normal.         Results Reviewed       None            XR wrist 3+ views RIGHT   Final Interpretation by Deandre Huerta MD (09/27 1321)      No acute fracture or dislocation.         Computerized Assisted Algorithm (CAA) may have been used to analyze all applicable images.            Workstation performed: [id number]             Procedures    ED Medication and Procedure Management   Prior to Admission Medications   Prescriptions Last Dose Informant Patient Reported? Taking?   hydrOXYzine pamoate (VISTARIL) 25 mg capsule   No No   Sig: Take 1 capsule (25 mg total) by mouth 3 (three) times a day as needed for itching   multivitamin-iron-minerals-folic acid (CENTRUM) chewable tablet   Yes No   Sig: Chew 1 tablet daily      Facility-Administered Medications: None     Discharge Medication List as of 9/27/2024 10:12 AM        CONTINUE these medications which have NOT CHANGED    Details   hydrOXYzine pamoate (VISTARIL) 25 mg capsule Take 1 capsule (25 mg total) by mouth 3 (three) times a day as needed for itching, Starting Mon 3/20/2023, Normal      multivitamin-iron-minerals-folic acid (CENTRUM) chewable tablet Chew 1 tablet daily, Until Discontinued, Historical Med             ED SEPSIS DOCUMENTATION   Time reflects when diagnosis was documented in both MDM as applicable and the Disposition within this note       Time User Action Codes Description Comment    9/27/2024 10:11 AM Laci Arshad Add [M25.531] Acute pain of right wrist                  Laci Arshad MD  09/28/24 0709

## 2025-02-04 ENCOUNTER — HOSPITAL ENCOUNTER (EMERGENCY)
Facility: HOSPITAL | Age: 70
Discharge: HOME | End: 2025-02-04
Attending: INTERNAL MEDICINE | Admitting: INTERNAL MEDICINE
Payer: MEDICARE

## 2025-02-04 VITALS
TEMPERATURE: 98.7 F | SYSTOLIC BLOOD PRESSURE: 139 MMHG | BODY MASS INDEX: 26.47 KG/M2 | RESPIRATION RATE: 16 BRPM | WEIGHT: 184.5 LBS | OXYGEN SATURATION: 95 % | DIASTOLIC BLOOD PRESSURE: 82 MMHG | HEART RATE: 105 BPM

## 2025-02-04 DIAGNOSIS — J06.9 UPPER RESPIRATORY INFECTION: Primary | ICD-10-CM

## 2025-02-04 LAB
FLUAV AG UPPER RESP QL IA.RAPID: POSITIVE
FLUBV AG UPPER RESP QL IA.RAPID: NEGATIVE
SARS-COV+SARS-COV-2 AG RESP QL IA.RAPID: NEGATIVE

## 2025-02-04 PROCEDURE — 99284 EMERGENCY DEPT VISIT MOD MDM: CPT

## 2025-02-04 PROCEDURE — 99283 EMERGENCY DEPT VISIT LOW MDM: CPT | Performed by: INTERNAL MEDICINE

## 2025-02-04 PROCEDURE — 87811 SARS-COV-2 COVID19 W/OPTIC: CPT | Performed by: INTERNAL MEDICINE

## 2025-02-04 PROCEDURE — 87804 INFLUENZA ASSAY W/OPTIC: CPT | Performed by: INTERNAL MEDICINE

## 2025-02-04 RX ORDER — IBUPROFEN 400 MG/1
400 TABLET, FILM COATED ORAL ONCE
Status: COMPLETED | OUTPATIENT
Start: 2025-02-04 | End: 2025-02-04

## 2025-02-04 RX ORDER — FLUTICASONE PROPIONATE 50 MCG
1 SPRAY, SUSPENSION (ML) NASAL DAILY
Qty: 16 G | Refills: 0 | Status: SHIPPED | OUTPATIENT
Start: 2025-02-04

## 2025-02-04 RX ORDER — GUAIFENESIN 600 MG/1
600 TABLET, EXTENDED RELEASE ORAL ONCE
Status: COMPLETED | OUTPATIENT
Start: 2025-02-04 | End: 2025-02-04

## 2025-02-04 RX ORDER — IBUPROFEN 400 MG/1
400 TABLET, FILM COATED ORAL EVERY 6 HOURS PRN
Qty: 30 TABLET | Refills: 0 | Status: SHIPPED | OUTPATIENT
Start: 2025-02-04

## 2025-02-04 RX ADMIN — IBUPROFEN 400 MG: 400 TABLET ORAL at 08:17

## 2025-02-04 RX ADMIN — GUAIFENESIN 600 MG: 600 TABLET ORAL at 08:17

## 2025-02-04 NOTE — ED PROVIDER NOTES
Chief Complaint   Patient presents with    Cough     Cough, sore throat, congestion. States he took tylenol this morning     HPI: Patient is a 69 y.o. male who presents with 2 days of cough, sore throat, and congestion  which the patient describes at moderate The patient has had contact with people with similar symptoms.  The patient has not taken any medication today except Tylenol.  He denies nausea, vomiting or diarrhea.  No other complaints or concerns.      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, and left arm    Pericarditis       Past Surgical History:   Procedure Laterality Date    AMPUTATION ARM Left     below elbow     Social History     Tobacco Use    Smoking status: Some Days     Types: Cigars     Passive exposure: Current    Smokeless tobacco: Never   Vaping Use    Vaping status: Never Used   Substance Use Topics    Alcohol use: Yes     Comment: socially    Drug use: No       Nursing notes reviewed  Physical Exam:  ED Triage Vitals   Temperature Pulse Respirations Blood Pressure SpO2   02/04/25 0754 02/04/25 0754 02/04/25 0754 02/04/25 0754 02/04/25 0754   98.7 °F (37.1 °C) 105 16 139/82 95 %      Temp Source Heart Rate Source Patient Position - Orthostatic VS BP Location FiO2 (%)   02/04/25 0754 02/04/25 0754 02/04/25 0754 02/04/25 0754 --   Oral Monitor Sitting Right arm       Pain Score       02/04/25 0817       6           ROS: Positive for cough, sore throat, and congestion, the remainder of a 10 organ system ROS was otherwise unremarkable.  General: awake, alert, no acute distress    Head: normocephalic, atraumatic    Eyes: no scleral icterus  Ears: external ears normal, hearing grossly intact  Nose: external exam grossly normal, positive nasal discharge  Neck: symmetric, No JVD noted, trachea midline  Pulmonary: no respiratory distress, no tachypnea noted  Cardiovascular: appears well  perfused  Abdomen: no distention noted  Musculoskeletal: no deformities noted, tone normal  Neuro: grossly non-focal  Psych: mood and affect appropriate    The patient is stable and has a history and physical exam consistent with a viral illness. COVID19 testing has been performed.  I considered the patient's other medical conditions as applicable/noted above in my medical decision making.  The patient is stable upon discharge. The plan is for supportive care at home.    The patient (and any family present) verbalized understanding of the discharge instructions and warnings that would necessitate return to the Emergency Department.  All questions were answered prior to discharge.    Medications   guaiFENesin (MUCINEX) 12 hr tablet 600 mg (600 mg Oral Given 2/4/25 0817)   ibuprofen (MOTRIN) tablet 400 mg (400 mg Oral Given 2/4/25 0817)     Final diagnoses:   Upper respiratory infection     Time reflects when diagnosis was documented in both MDM as applicable and the Disposition within this note       Time User Action Codes Description Comment    2/4/2025  8:08 AM Nena Potter [J06.9] Upper respiratory infection           ED Disposition       ED Disposition   Discharge    Condition   Stable    Date/Time   Tue Feb 4, 2025  8:07 AM    Comment   Ha Hardy discharge to home/self care.                   Follow-up Information       Follow up With Specialties Details Why Contact Info    [facility], Nurse Practitioner Call  If symptoms worsen [address] [place] [address]  [phone]            Patient's Medications   Discharge Prescriptions    FLUTICASONE (FLONASE) 50 MCG/ACT NASAL SPRAY    1 spray into each nostril daily       Start Date: 2/4/2025  End Date: --       Order Dose: 1 spray       Quantity: 16 g    Refills: 0    GUAIFENESIN (ROBITUSSIN) 100 MG/5 ML SYRUP    Take 10 mL (200 mg total) by mouth 3 (three) times a day as needed for cough for up to 10  days       Start Date: 2/4/2025  End Date: 2/14/2025       Order Dose: 200 mg       Quantity: 120 mL    Refills: 0    IBUPROFEN (MOTRIN) 400 MG TABLET    Take 1 tablet (400 mg total) by mouth every 6 (six) hours as needed for mild pain, moderate pain or fever       Start Date: 2/4/2025  End Date: --       Order Dose: 400 mg       Quantity: 30 tablet    Refills: 0     No discharge procedures on file.    Electronically Signed by       Nena Potter MD  02/04/25 0829